# Patient Record
Sex: FEMALE | URBAN - METROPOLITAN AREA
[De-identification: names, ages, dates, MRNs, and addresses within clinical notes are randomized per-mention and may not be internally consistent; named-entity substitution may affect disease eponyms.]

---

## 2022-01-10 ENCOUNTER — OFFICE VISIT (OUTPATIENT)
Dept: URBAN - METROPOLITAN AREA CLINIC 85 | Facility: CLINIC | Age: 77
End: 2022-01-10

## 2022-01-10 DIAGNOSIS — H16.141 PUNCTATE KERATITIS OF RIGHT EYE: Primary | ICD-10-CM

## 2022-01-10 DIAGNOSIS — H43.813 VITREOUS DEGENERATION, BILATERAL: ICD-10-CM

## 2022-01-10 DIAGNOSIS — H40.1421 CAPSLR GLAUCOMA W/PSEUDOEF LENS, LEFT EYE, MILD STAGE: ICD-10-CM

## 2022-01-10 DIAGNOSIS — H25.812 COMBINED FORMS OF AGE-RELATED CATARACT, LEFT EYE: ICD-10-CM

## 2022-01-10 DIAGNOSIS — H11.31 SUBCONJUNCTIVAL HEMORRHAGE OF RIGHT EYE: ICD-10-CM

## 2022-01-10 PROCEDURE — 92004 COMPRE OPH EXAM NEW PT 1/>: CPT | Performed by: OPTOMETRIST

## 2022-01-10 ASSESSMENT — INTRAOCULAR PRESSURE
OS: 12
OD: 12

## 2022-01-10 NOTE — IMPRESSION/PLAN
Impression: Combined forms of age-related cataract, left eye: H25.812. Plan: Discussed findings. Discussed option of CE/IOL OU. Patient understands cataract effect on vision. Patient defers to have  CE w/IOL consult with Dr. Segun Santos at this time. Continue to monitor. RTC 1 year CEE or ASAP if decreased VA or pain.

## 2022-01-10 NOTE — IMPRESSION/PLAN
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 Plan: Discussed diagnosis in detail. Recommend Systane Complete QID OU. Discussed prescription medication options such as Restasis and Dewaine Oven in the future. Also discussed potential of punctal plugs/punctal cautery.

## 2022-01-10 NOTE — IMPRESSION/PLAN
Impression: Subconjunctival hemorrhage of right eye: H11.31. Plan: Discussed benign findings with patient. No treatment needed. Monitor.

## 2022-01-10 NOTE — IMPRESSION/PLAN
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 Plan: Discussed findings with patient. Monitor routinely for  potential glaucoma.